# Patient Record
Sex: FEMALE | Race: WHITE
[De-identification: names, ages, dates, MRNs, and addresses within clinical notes are randomized per-mention and may not be internally consistent; named-entity substitution may affect disease eponyms.]

---

## 2019-06-23 ENCOUNTER — HOSPITAL ENCOUNTER (EMERGENCY)
Dept: HOSPITAL 97 - ER | Age: 19
Discharge: HOME | End: 2019-06-23
Payer: SELF-PAY

## 2019-06-23 DIAGNOSIS — J18.9: Primary | ICD-10-CM

## 2019-06-23 DIAGNOSIS — N39.0: ICD-10-CM

## 2019-06-23 DIAGNOSIS — Z88.8: ICD-10-CM

## 2019-06-23 LAB
BUN BLD-MCNC: 5 MG/DL (ref 7–18)
GLUCOSE SERPLBLD-MCNC: 88 MG/DL (ref 74–106)
HCT VFR BLD CALC: 40.3 % (ref 36–45)
LYMPHOCYTES # SPEC AUTO: 1.8 K/UL (ref 0.7–4.9)
MORPHOLOGY BLD-IMP: (no result)
PMV BLD: 8.5 FL (ref 7.6–11.3)
POTASSIUM SERPL-SCNC: 3.3 MMOL/L (ref 3.5–5.1)
RBC # BLD: 4.84 M/UL (ref 3.86–4.86)
UA COMPLETE W REFLEX CULTURE PNL UR: (no result)

## 2019-06-23 PROCEDURE — 87088 URINE BACTERIA CULTURE: CPT

## 2019-06-23 PROCEDURE — 87804 INFLUENZA ASSAY W/OPTIC: CPT

## 2019-06-23 PROCEDURE — 96375 TX/PRO/DX INJ NEW DRUG ADDON: CPT

## 2019-06-23 PROCEDURE — 36415 COLL VENOUS BLD VENIPUNCTURE: CPT

## 2019-06-23 PROCEDURE — 81025 URINE PREGNANCY TEST: CPT

## 2019-06-23 PROCEDURE — 96361 HYDRATE IV INFUSION ADD-ON: CPT

## 2019-06-23 PROCEDURE — 71046 X-RAY EXAM CHEST 2 VIEWS: CPT

## 2019-06-23 PROCEDURE — 94640 AIRWAY INHALATION TREATMENT: CPT

## 2019-06-23 PROCEDURE — 87086 URINE CULTURE/COLONY COUNT: CPT

## 2019-06-23 PROCEDURE — 81015 MICROSCOPIC EXAM OF URINE: CPT

## 2019-06-23 PROCEDURE — 96365 THER/PROPH/DIAG IV INF INIT: CPT

## 2019-06-23 PROCEDURE — 85025 COMPLETE CBC W/AUTO DIFF WBC: CPT

## 2019-06-23 PROCEDURE — 99284 EMERGENCY DEPT VISIT MOD MDM: CPT

## 2019-06-23 PROCEDURE — 81003 URINALYSIS AUTO W/O SCOPE: CPT

## 2019-06-23 PROCEDURE — 80048 BASIC METABOLIC PNL TOTAL CA: CPT

## 2019-06-23 NOTE — RAD REPORT
EXAM DESCRIPTION:  RAD - Chest Pa And Lat (2 Views) - 6/23/2019 3:21 pm

 

CLINICAL HISTORY:  Abdominal pain, cough, vomiting

 

COMPARISON:  None.

 

TECHNIQUE:  PA and lateral views of the chest were obtained.

 

FINDINGS:  The lungs are normal volume. Interstitial and patchy alveolar opacities are present in the
 mid and lower right lung field asymmetric with the left lung field. Lateral view has motion degradat
ion.   Heart size is normal and central vasculature is within normal limits.  No pleural effusion or 
pneumothorax seen.  No acute bony finding noted.  No aortic abnormality.

 

IMPRESSION:  Interstitial and alveolar pneumonia mid and lower right lung field

## 2019-06-24 NOTE — EDPHYS
Physician Documentation                                                                           

 [facility]                                                                 

Name: Hayley Arias                                                                                  

Age: 18 yrs                                                                                       

Sex: Female                                                                                       

: 2000                                                                                   

MRN: [id number]                                                                                   

Arrival Date: 2019                                                                          

Time: 14:29                                                                                       

Account#: [id number]                                                                            

Bed 20                                                                                            

Private MD:                                                                                       

ED Physician Wesly Hwang                                                                      

HPI:                                                                                              

                                                                                             

14:47 This 18 yrs old  Female presents to ER via Ambulatory with complaints of       jmm 

      Cough, Vomiting/Diarrhea.                                                                   

14:47 The patient or guardian reports cough. Onset: The symptoms/episode began/occurred       jmm 

      gradually, 1 week(s) ago. Modifying factors: The symptoms are alleviated by nothing,        

      the symptoms are aggravated by nothing. Associated signs and symptoms: Pertinent            

      positives: fever, vomiting. This is a 18 year old female with no chronic medical            

      conditions that presents to the ED with complaints of cough, vomiting, diarrhea,            

      beginning 1 week ago. Denies abdominal pain. .                                              

                                                                                                  

OB/GYN:                                                                                           

14:42 LMP 2019                                                                           aa5 

                                                                                                  

Historical:                                                                                       

- Allergies:                                                                                      

14:42 Pepto-Bismol;                                                                           aa5 

- PMHx:                                                                                           

14:42 None;                                                                                   aa5 

- PSHx:                                                                                           

14:42 None;                                                                                   aa5 

                                                                                                  

- Immunization history:: Adult Immunizations up to date.                                          

- Social history:: Smoking status: Patient/guardian denies using tobacco.                         

- Ebola Screening: : No symptoms or risks identified at this time.                                

                                                                                                  

                                                                                                  

ROS:                                                                                              

14:47 Back: Negative for injury and pain, Neuro: Negative for headache, weakness, numbness,   jmm 

      tingling, and seizure.                                                                      

14:47 Constitutional: Positive for body aches, fever.                                             

14:47 Respiratory: Positive for cough.                                                            

14:47 Abdomen/GI: Positive for vomiting, diarrhea.                                                

14:47 All other systems are negative.                                                             

                                                                                                  

Exam:                                                                                             

14:47 Head/Face:  atraumatic. Eyes:  EOMI, no conjunctival erythema appreciated ENT:  Moist   jmm 

      Mucus Membranes Neck:  Trachea midline, Supple Chest/axilla:  Normal chest wall             

      appearance and motion.                                                                      

14:47 Abdomen/GI:  Non distended, soft Back:  Normal ROM Skin:  General appearance color          

      normal MS/ Extremity:  Moves all extremities, no obvious deformities appreciated, no        

      edema noted to the lower extremities  Neuro:  Awake and alert, normal gait Psych:           

      Behavior is normal, Mood is normal, Patient is cooperative and pleasant                     

14:47 Constitutional: The patient appears in no acute distress, alert, awake.                     

14:47 Cardiovascular: Rate: tachycardic, Rhythm: regular.                                         

14:47 Respiratory: the patient does not display signs of respiratory distress,  Respirations:     

      normal, Breath sounds: wheezing: that is mild, is heard in the  right posterior upper       

      lobe and right posterior middle lobe.                                                       

                                                                                                  

Vital Signs:                                                                                      

14:42  / 50; Pulse 106; Resp 18; Temp 99.7(O); Pulse Ox 93% on R/A; Weight 95.25 kg     aa5 

      (R); Height 5 ft. 1 in. (154.94 cm) (R); Pain 0/10;                                         

16:25  / 48; Pulse 117; Resp 20; Pulse Ox 94% on R/A;                                   em  

17:10 BP 98 / 63; Pulse 107; Resp 18; Pulse Ox 95% on R/A;                                    em  

18:07 BP 97 / 58; Pulse 116; Resp 22; Temp 99.3(O); Pulse Ox 93% on R/A;                      em  

19:15  / 53; Pulse 108; Resp 20 S; Temp 98.5(O); Pulse Ox 93% on R/A;                   cc3 

20:30  / 58; Pulse 100; Resp 20 S; Temp 98.6(O); Pulse Ox 95% on R/A;                   cc3 

20:47  / 57; Pulse 98; Resp 20 S; Pulse Ox 95% on R/A;                                  cc3 

14:42 Body Mass Index 39.68 (95.25 kg, 154.94 cm)                                             aa5 

20:30 after patient walked around                                                             cc3 

                                                                                                  

MDM:                                                                                              

14:47 Patient medically screened.                                                             [facility] 

19:40 Data reviewed: vital signs, nurses notes.                                               [facility] 

19:40 Counseling: I had a detailed discussion with the patient and/or guardian regarding: the [facility] 

      historical points, exam findings, and any diagnostic results supporting the                 

      discharge/admit diagnosis, lab results, radiology results, the need for outpatient          

      follow up, to return to the emergency department if symptoms worsen or persist or if        

      there are any questions or concerns that arise at home.                                     

20:36 ED course: Patient's o2 saturation is at or above 92%. cbc normal. non labored          [facility] 

      respirations. patient prescribed oral antibiotic and given strict return precautions.       

      Patient understood and agrees with the plan of care. .                                      

                                                                                                  

                                                                                             

14:57 Order name: Flu; Complete Time: 16:04                                                   em  

                                                                                             

14:57 Order name: CBC with Manual Differential; Complete Time: 18:07                          em  

                                                                                             

14:57 Order name: Chem 7; Complete Time: 16:04                                                em  

                                                                                             

18:22 Order name: Urine Dipstick--Ancillary (enter results)                                   bd  

                                                                                             

18:22 Order name: Urine Pregnancy--Ancillary (enter results)                                  bd  

                                                                                             

18:55 Order name: Urine Microscopic Only; Complete Time: 19:48                                em  

                                                                                             

20:13 Interpretation: UBACT >50.                                                              [facility] 

                                                                                             

14:57 Order name: Chest Pa And Lat (2 Views) XRAY; Complete Time: 17:05                       em  

                                                                                             

19:43 Order name: Urine Culture                                                               [facility]

                                                                                             

14:57 Order name: Urine Dipstick-Ancillary (obtain specimen); Complete Time: 18:18            em  

                                                                                             

20:12 Order name: Vital Signs; Complete Time: 20:37                                           [facility] 

                                                                                                  

Administered Medications:                                                                         

15:27 Drug: DuoNeb (3:1) (2.5 mg - 0.5 mg) 3 ml Route: Nebulizer;                             em  

16:17 Follow up: Response: No adverse reaction; Marked relief of symptoms                     em  

15:38 Drug: Tylenol 650 mg Route: PO;                                                         em  

18:14 Follow up: Response: No adverse reaction; Temperature is decreased                      em  

15:39 Drug: NS 0.9% 1000 ml Route: IV; Rate: 1 bolus; Site: right antecubital;                em  

15:41 Drug: Zofran 4 mg Route: IVP; Site: right antecubital;                                  iw  

16:17 Follow up: Response: No adverse reaction; Nausea is decreased                           em  

17:25 Drug: Xopenex (3) 1.25 mg Route: Inhalation;                                            em  

18:14 Follow up: Response: No adverse reaction                                                em  

17:26 Drug: Rocephin 1 grams Route: IV; Rate: calculated rate; Site: right antecubital;       iw  

18:23 Follow up: IV Status: Completed infusion; IV Intake: 10ml                               em  

18:24 Drug: NS 0.9% 1000 ml Route: IV; Rate: 1 bolus; Site: right antecubital;                em  

19:25 Follow up: Response: No adverse reaction; IV Status: Completed infusion; IV Intake:     cc3 

      1000ml                                                                                      

18:24 Drug: Motrin 800 mg Route: PO;                                                          em  

19:15 Follow up: Response: No adverse reaction; Temperature is decreased                      cc3 

                                                                                                  

                                                                                                  

Disposition:                                                                                      

                                                                                             

10:02 Co-signature as Attending Physician, Wesly Hwang MD I agree with the assessment and  [facility] 

      plan of care.                                                                               

                                                                                                  

Disposition:                                                                                      

19 20:37 Discharged to Home. Impression: Pneumonia, Urinary tract infection, site not       

  specified.                                                                                      

- Condition is Stable.                                                                            

- Discharge Instructions: Community-Acquired Pneumonia, Adult.                                    

- Prescriptions for Levaquin 750 mg Oral Tablet - take 1 tablet by ORAL route once                

  daily for 10 days; 10 tablet. Albuterol Sulfate 90 mcg/actuation - inhale 1-2 puff by           

  INHALATION route every 4-6 hours; 1 Inhaler.                                                    

- Medication Reconciliation Form, Thank You Letter, Antibiotic Education, Prescription            

  Opioid Use, Work release form form.                                                             

- Follow up: Private Physician; When: 1 - 2 days; Reason: Recheck today's complaints,             

  Continuance of care, Re-evaluation by your physician.                                           

                                                                                                  

                                                                                                  

                                                                                                  

Signatures:                                                                                       

Dispatcher [employer]                           Wesly Smith MD MD cha Mickail, Joel, PA                       PA   [facility]                                                  

Memo Vivas, LVN                       LVN                                                     

Marya Swanson, BRANDO MICHAELS                                                      

Diamond Lowe RN                     RN   aa5                                                  

Eli Cormier                             cc3                                                  

                                                                                                  

Corrections: (The following items were deleted from the chart)                                    

                                                                                             

20:38 20:37 2019 20:37 Discharged to Home. Impression: Pneumonia. Condition is Stable.  [facility] 

      Forms are Medication Reconciliation Form, Thank You Letter, Antibiotic Education,           

      Prescription Opioid Use. Follow up: Private Physician; When: 1 - 2 days; Reason:            

      Recheck today's complaints, Continuance of care, Re-evaluation by your physician. [facility]       

20:57 20:38 2019 20:37 Discharged to Home. Impression: Pneumonia; Urinary tract         cc3 

      infection, site not specified. Condition is Stable. Discharge Instructions:                 

      Community-Acquired Pneumonia, Adult. Prescriptions for Levaquin 750 mg Oral Tablet -        

      take 1 tablet by ORAL route once daily for 10 days; 10 tablet. and Forms are Medication     

      Reconciliation Form, Thank You Letter, Antibiotic Education, Prescription Opioid Use.       

      Follow up: Private Physician; When: 1 - 2 days; Reason: Recheck today's complaints,         

      Continuance of care, Re-evaluation by your physician. [facility]                                   

                                                                                                  

**************************************************************************************************

## 2019-06-24 NOTE — ER
Nurse's Notes                                                                                     

 [facility]                                                                 

Name: Hayley Arias                                                                                  

Age: 18 yrs                                                                                       

Sex: Female                                                                                       

: 2000                                                                                   

MRN: [id number]                                                                                   

Arrival Date: 2019                                                                          

Time: 14:29                                                                                       

Account#: [id number]                                                                            

Bed 20                                                                                            

Private MD:                                                                                       

Diagnosis: Pneumonia;Urinary tract infection, site not specified                                  

                                                                                                  

Presentation:                                                                                     

                                                                                             

14:41 Presenting complaint: Patient states: cough x 2 week ago and vomiting/diarrhea that     aa5 

      began x 1 week ago. Transition of care: patient was not received from another setting       

      of care. Onset of symptoms was 2019. Risk Assessment: Do you want to hurt yourself     

      or someone else? Patient reports no desire to harm self or others. Initial Sepsis           

      Screen: Does the patient meet any 2 criteria? No. Patient's initial sepsis screen is        

      negative. Does the patient have a suspected source of infection? No. Patient's initial      

      sepsis screen is negative. Care prior to arrival: None.                                     

14:41 Acuity: JASPER 3                                                                           aa5 

14:41 Method Of Arrival: Ambulatory                                                           aa5 

                                                                                                  

OB/GYN:                                                                                           

14:42 LMP 2019                                                                           aa5 

                                                                                                  

Historical:                                                                                       

- Allergies:                                                                                      

14:42 Pepto-Bismol;                                                                           aa5 

- PMHx:                                                                                           

14:42 None;                                                                                   aa5 

- PSHx:                                                                                           

14:42 None;                                                                                   aa5 

                                                                                                  

- Immunization history:: Adult Immunizations up to date.                                          

- Social history:: Smoking status: Patient/guardian denies using tobacco.                         

- Ebola Screening: : No symptoms or risks identified at this time.                                

                                                                                                  

                                                                                                  

Screenin:55 Abuse screen: Denies threats or abuse. Nutritional screening: No deficits noted.        em  

      Tuberculosis screening: No symptoms or risk factors identified. Fall Risk None              

      identified.                                                                                 

                                                                                                  

Assessment:                                                                                       

14:55 General: Appears in no apparent distress. distressed, Behavior is calm, cooperative,    em  

      Reports chills for >3 days. Pain: Denies pain. Neuro: Level of Consciousness is awake,      

      alert, obeys commands, Oriented to person, place, time, situation. Cardiovascular:          

      Capillary refill < 3 seconds Patient's skin is warm and dry. Respiratory: Reports           

      shortness of breath on exertion since 2 weeks cough that is productive, Airway is           

      patent Respiratory effort is even, unlabored, Respiratory pattern is regular,               

      symmetrical, Breath sounds are diminished in right posterior middle lobe and right          

      posterior lower lobe. GI: Abdomen is flat, Reports diarrhea, nausea, vomiting. :          

      Denies burning with urination. Derm: Skin is intact, is healthy with good turgor, Skin      

      is pink, warm \T\ dry. Musculoskeletal: Capillary refill < 3 seconds, Range of motion:      

      intact in all extremities.                                                                  

16:15 Reassessment: Patient appears in no apparent distress at this time. Patient and/or      em  

      family updated on plan of care and expected duration. Pain level reassessed. Patient is     

      alert, oriented x 3, equal unlabored respirations, skin warm/dry/pink.                      

17:00 Reassessment: Patient appears in no apparent distress at this time. Patient and/or      em  

      family updated on plan of care and expected duration. Pain level reassessed. Patient is     

      alert, oriented x 3, equal unlabored respirations, skin warm/dry/pink. Patient states       

      feeling better.                                                                             

18:05 Reassessment: Patient appears in no apparent distress at this time. Patient and/or      em  

      family updated on plan of care and expected duration. Pain level reassessed. Patient is     

      alert, oriented x 3, equal unlabored respirations, skin warm/dry/pink.                      

19:15 Reassessment: Patient appears in no apparent distress at this time. Patient and/or      cc3 

      family updated on plan of care and expected duration. Pain level reassessed. Patient is     

      alert, oriented x 3, equal unlabored respirations, skin warm/dry/pink. Received this        

      female patient from morning shift LVN Memo as a case of cough. With IV cannula gauge       

      20 at the right ACV with ongoing IVF of NS 1L infusing well. Patient denies pain at         

      this time.                                                                                  

19:15 General: Appears in no apparent distress. comfortable, Behavior is calm, cooperative,   cc3 

      appropriate for age. Pain: Denies pain. Neuro: Level of Consciousness is awake, alert,      

      obeys commands, Oriented to person, place, time, situation, Appropriate for age.            

      Cardiovascular: Capillary refill < 3 seconds Patient's skin is warm and dry.                

      Respiratory: Airway is patent Respiratory effort is even, unlabored, Respiratory            

      pattern is regular, symmetrical, Breath sounds are diminished in right posterior upper      

      lobe and right posterior lower lobe and right posterior middle lobe. GI: Abdomen is         

      round non-distended. : No signs and/or symptoms were reported regarding the               

      genitourinary system. EENT: No signs and/or symptoms were reported regarding the EENT       

      system. Derm: Skin is intact, is healthy with good turgor, Skin is pink, warm \T\ dry.      

      normal. Musculoskeletal: Circulation, motion, and sensation intact. Range of motion:        

      intact in all extremities.                                                                  

20:30 Reassessment: Patient appears in no apparent distress at this time. Patient and/or      cc3 

      family updated on plan of care and expected duration. Pain level reassessed. Patient is     

      alert, oriented x 3, equal unlabored respirations, skin warm/dry/pink. Had the patient      

      walk around then vital signs taken and charted, patient said she doesn't have any           

      difficulty breathing before, during and after walking, LOKI Holguin informed and said he      

      will discharge the patient home. Called for the patient's family Gloria Canales at        

      [phone] to inform that the patient is for discharge home to be picked up. Patient        

      denies pain at this time. Patient states feeling better. Patient states symptoms have       

      improved.                                                                                   

20:55 Reassessment: Patient appears in no apparent distress at this time. Patient and/or      cc3 

      family updated on plan of care and expected duration. Pain level reassessed. Patient is     

      alert, oriented x 3, equal unlabored respirations, skin warm/dry/pink. LOKI Holguin           

      discharged the patient home with prescriptions given and instructed to come back if         

      worsening of symptoms. IV cannula removed and patient left ER vitally stable and            

      ambulatory with her family. Patient denies pain at this time. Patient states feeling        

      better. Patient states symptoms have improved.                                              

                                                                                                  

Vital Signs:                                                                                      

14:42  / 50; Pulse 106; Resp 18; Temp 99.7(O); Pulse Ox 93% on R/A; Weight 95.25 kg     aa5 

      (R); Height 5 ft. 1 in. (154.94 cm) (R); Pain 0/10;                                         

16:25  / 48; Pulse 117; Resp 20; Pulse Ox 94% on R/A;                                   em  

17:10 BP 98 / 63; Pulse 107; Resp 18; Pulse Ox 95% on R/A;                                    em  

18:07 BP 97 / 58; Pulse 116; Resp 22; Temp 99.3(O); Pulse Ox 93% on R/A;                      em  

19:15  / 53; Pulse 108; Resp 20 S; Temp 98.5(O); Pulse Ox 93% on R/A;                   cc3 

20:30  / 58; Pulse 100; Resp 20 S; Temp 98.6(O); Pulse Ox 95% on R/A;                   cc3 

20:47  / 57; Pulse 98; Resp 20 S; Pulse Ox 95% on R/A;                                  cc3 

14:42 Body Mass Index 39.68 (95.25 kg, 154.94 cm)                                             aa5 

20:30 after patient walked around                                                             3 

                                                                                                  

ED Course:                                                                                        

14:29 Patient arrived in ED.                                                                  mr  

14:41 Arm band placed on.                                                                     aa5 

14:42 Triage completed.                                                                       aa5 

14:45 Memo Vivas LVN is Primary Nurse.                                                     em  

14:47 Fer Holguin PA is PHCP.                                                              jmm 

14:47 Wesly Hwang MD is Attending Physician.                                             jmm 

14:55 Patient has correct armband on for positive identification. Placed in gown. Bed in low  em  

      position. Call light in reach. Pulse ox on. NIBP on.                                        

15:18 Chest Pa And Lat (2 Views) XRAY In Process Unspecified.                                 EDMS

15:22 Flu and/or RSV swab sent to lab.                                                        jb1 

15:22 Missed attempt(s): 22 gauge in left antecubital area.                                   jb1 

15:22 Missed attempt(s): 22 gauge in right antecubital area.                                  jb1 

15:40 Initial lab(s) drawn, by me, sent to lab. Inserted saline lock: 20 gauge in right Blood em  

      collected.                                                                                  

20:55 No provider procedures requiring assistance completed. IV discontinued, intact,         cc3 

      bleeding controlled, No redness/swelling at site. Pressure dressing applied.                

                                                                                                  

Administered Medications:                                                                         

15:27 Drug: DuoNeb (3:1) (2.5 mg - 0.5 mg) 3 ml Route: Nebulizer;                             em  

16:17 Follow up: Response: No adverse reaction; Marked relief of symptoms                     em  

15:38 Drug: Tylenol 650 mg Route: PO;                                                         em  

18:14 Follow up: Response: No adverse reaction; Temperature is decreased                      em  

15:39 Drug: NS 0.9% 1000 ml Route: IV; Rate: 1 bolus; Site: right antecubital;                em  

15:41 Drug: Zofran 4 mg Route: IVP; Site: right antecubital;                                  iw  

16:17 Follow up: Response: No adverse reaction; Nausea is decreased                           em  

17:25 Drug: Xopenex (3) 1.25 mg Route: Inhalation;                                            em  

18:14 Follow up: Response: No adverse reaction                                                em  

17:26 Drug: Rocephin 1 grams Route: IV; Rate: calculated rate; Site: right antecubital;       iw  

18:23 Follow up: IV Status: Completed infusion; IV Intake: 10ml                               em  

18:24 Drug: NS 0.9% 1000 ml Route: IV; Rate: 1 bolus; Site: right antecubital;                em  

19:25 Follow up: Response: No adverse reaction; IV Status: Completed infusion; IV Intake:     cc3 

      1000ml                                                                                      

18:24 Drug: Motrin 800 mg Route: PO;                                                          em  

19:15 Follow up: Response: No adverse reaction; Temperature is decreased                      cc3 

                                                                                                  

                                                                                                  

Intake:                                                                                           

18:23 IV: 10ml; Total: 10ml.                                                                  em  

19:25 IV: 1000ml; Total: 1010ml.                                                              cc3 

                                                                                                  

Outcome:                                                                                          

20:37 Discharge ordered by MD.                                                                [facility] 

20:55 Discharged to home ambulatory, with family.                                             cc3 

20:55 Condition: stable                                                                           

20:55 Discharge instructions given to patient, family, Instructed on discharge instructions,      

      follow up and referral plans. medication usage, Demonstrated understanding of               

      instructions, follow-up care, medications, Prescriptions given X 2.                         

20:57 Patient left the ED.                                                                    cc3 

                                                                                                  

Signatures:                                                                                       

Dispatcher [employer]                           EDMS                                                 

Calin Sheldon Joel, PA PA jmm Rivera, Mary mr MunozMemo, LVN                       LVN  em                                                   

Marya Swanson RN RN   iw                                                   

Diamond Lowe RN RN   aa5                                                  

Eli Cormier                             cc3                                                  

                                                                                                  

Corrections: (The following items were deleted from the chart)                                    

14:44 14:42  / 50; Pulse 106bpm; Resp 18bpm; Pulse Ox 93% RA; Temp 98.6F Temporal;      aa5 

      95.25 kg Reported; Height 5 ft. 1 in. Reported; BMI: 39.6; Pain 0/10; aa5                   

16:15 14:55 Respiratory: Airway is patent Respiratory effort is even, unlabored, Respiratory  em  

      pattern is regular, symmetrical, Breath sounds are diminished in right posterior middle     

      lobe and right posterior lower lobe em                                                      

20:36 20:30  / 58; Pulse 100bpm; Resp 20bpm; Spontaneous; Pulse Ox 95% RA; Temp 98.6F   cc3 

      Oral; cc3                                                                                   

                                                                                                  

**************************************************************************************************